# Patient Record
Sex: MALE | Race: BLACK OR AFRICAN AMERICAN | Employment: OTHER | URBAN - METROPOLITAN AREA
[De-identification: names, ages, dates, MRNs, and addresses within clinical notes are randomized per-mention and may not be internally consistent; named-entity substitution may affect disease eponyms.]

---

## 2021-10-01 ENCOUNTER — APPOINTMENT (OUTPATIENT)
Dept: GENERAL RADIOLOGY | Age: 61
End: 2021-10-01
Attending: EMERGENCY MEDICINE
Payer: MEDICARE

## 2021-10-01 ENCOUNTER — HOSPITAL ENCOUNTER (EMERGENCY)
Age: 61
Discharge: HOME | End: 2021-10-01
Attending: EMERGENCY MEDICINE
Payer: MEDICARE

## 2021-10-01 VITALS
OXYGEN SATURATION: 100 % | SYSTOLIC BLOOD PRESSURE: 149 MMHG | HEART RATE: 68 BPM | RESPIRATION RATE: 19 BRPM | DIASTOLIC BLOOD PRESSURE: 85 MMHG

## 2021-10-01 DIAGNOSIS — R07.89 ATYPICAL CHEST PAIN: Primary | ICD-10-CM

## 2021-10-01 LAB
ALBUMIN SERPL-MCNC: 4.3 G/DL (ref 3.4–5)
ALBUMIN/GLOB SERPL: 1.3 {RATIO} (ref 0.8–1.7)
ALP SERPL-CCNC: 90 U/L (ref 45–117)
ALT SERPL-CCNC: 30 U/L (ref 16–61)
ANION GAP SERPL CALC-SCNC: 6 MMOL/L (ref 3–18)
AST SERPL-CCNC: 16 U/L (ref 10–38)
BASOPHILS # BLD: 0.1 K/UL (ref 0–0.1)
BASOPHILS NFR BLD: 1 % (ref 0–2)
BILIRUB SERPL-MCNC: 0.5 MG/DL (ref 0.2–1)
BNP SERPL-MCNC: 59 PG/ML (ref 0–900)
BUN SERPL-MCNC: 16 MG/DL (ref 7–18)
BUN/CREAT SERPL: 14 (ref 12–20)
CALCIUM SERPL-MCNC: 9.7 MG/DL (ref 8.5–10.1)
CHLORIDE SERPL-SCNC: 108 MMOL/L (ref 100–111)
CK MB CFR SERPL CALC: NORMAL % (ref 0–4)
CK MB CFR SERPL CALC: NORMAL % (ref 0–4)
CK MB SERPL-MCNC: <1 NG/ML (ref 5–25)
CK MB SERPL-MCNC: <1 NG/ML (ref 5–25)
CK SERPL-CCNC: 235 U/L (ref 39–308)
CK SERPL-CCNC: 254 U/L (ref 39–308)
CO2 SERPL-SCNC: 28 MMOL/L (ref 21–32)
CREAT SERPL-MCNC: 1.12 MG/DL (ref 0.6–1.3)
D DIMER PPP FEU-MCNC: 0.31 UG/ML(FEU)
DIFFERENTIAL METHOD BLD: ABNORMAL
EOSINOPHIL # BLD: 0.1 K/UL (ref 0–0.4)
EOSINOPHIL NFR BLD: 1 % (ref 0–5)
ERYTHROCYTE [DISTWIDTH] IN BLOOD BY AUTOMATED COUNT: 12.1 % (ref 11.6–14.5)
GLOBULIN SER CALC-MCNC: 3.3 G/DL (ref 2–4)
GLUCOSE SERPL-MCNC: 195 MG/DL (ref 74–99)
HCT VFR BLD AUTO: 44.5 % (ref 36–48)
HGB BLD-MCNC: 14.7 G/DL (ref 13–16)
LYMPHOCYTES # BLD: 1.2 K/UL (ref 0.9–3.6)
LYMPHOCYTES NFR BLD: 32 % (ref 21–52)
MAGNESIUM SERPL-MCNC: 2.1 MG/DL (ref 1.6–2.6)
MCH RBC QN AUTO: 27 PG (ref 24–34)
MCHC RBC AUTO-ENTMCNC: 33 G/DL (ref 31–37)
MCV RBC AUTO: 81.7 FL (ref 78–100)
MONOCYTES # BLD: 0.4 K/UL (ref 0.05–1.2)
MONOCYTES NFR BLD: 9 % (ref 3–10)
NEUTS SEG # BLD: 2.2 K/UL (ref 1.8–8)
NEUTS SEG NFR BLD: 56 % (ref 40–73)
PLATELET # BLD AUTO: 215 K/UL (ref 135–420)
PMV BLD AUTO: 10.4 FL (ref 9.2–11.8)
POTASSIUM SERPL-SCNC: 3.8 MMOL/L (ref 3.5–5.5)
PROT SERPL-MCNC: 7.6 G/DL (ref 6.4–8.2)
RBC # BLD AUTO: 5.45 M/UL (ref 4.35–5.65)
SODIUM SERPL-SCNC: 142 MMOL/L (ref 136–145)
TROPONIN I SERPL-MCNC: <0.02 NG/ML (ref 0–0.04)
TROPONIN I SERPL-MCNC: <0.02 NG/ML (ref 0–0.04)
WBC # BLD AUTO: 3.9 K/UL (ref 4.6–13.2)

## 2021-10-01 PROCEDURE — 80053 COMPREHEN METABOLIC PANEL: CPT

## 2021-10-01 PROCEDURE — 71045 X-RAY EXAM CHEST 1 VIEW: CPT

## 2021-10-01 PROCEDURE — 82553 CREATINE MB FRACTION: CPT

## 2021-10-01 PROCEDURE — 93005 ELECTROCARDIOGRAM TRACING: CPT

## 2021-10-01 PROCEDURE — 85025 COMPLETE CBC W/AUTO DIFF WBC: CPT

## 2021-10-01 PROCEDURE — 83880 ASSAY OF NATRIURETIC PEPTIDE: CPT

## 2021-10-01 PROCEDURE — 85379 FIBRIN DEGRADATION QUANT: CPT

## 2021-10-01 PROCEDURE — 99284 EMERGENCY DEPT VISIT MOD MDM: CPT

## 2021-10-01 PROCEDURE — 74011250637 HC RX REV CODE- 250/637: Performed by: EMERGENCY MEDICINE

## 2021-10-01 PROCEDURE — 83735 ASSAY OF MAGNESIUM: CPT

## 2021-10-01 RX ORDER — CLOPIDOGREL BISULFATE 75 MG/1
75 TABLET ORAL DAILY
Status: DISCONTINUED | OUTPATIENT
Start: 2021-10-02 | End: 2021-10-01 | Stop reason: HOSPADM

## 2021-10-01 RX ORDER — CLOPIDOGREL BISULFATE 75 MG/1
300 TABLET ORAL
Status: COMPLETED | OUTPATIENT
Start: 2021-10-01 | End: 2021-10-01

## 2021-10-01 RX ADMIN — CLOPIDOGREL BISULFATE 300 MG: 75 TABLET ORAL at 13:25

## 2021-10-01 NOTE — ED PROVIDER NOTES
EMERGENCY DEPARTMENT HISTORY AND PHYSICAL EXAM    Date: 10/1/2021  Patient Name: Stacie Alfaro. History of Presenting Illness     Chief Complaint   Patient presents with    Chest Pain         History Provided By: Patient      Additional History (Context): Stacie Chaudhari is a 59 y.o. male with diabetes, obesity, myocardial infarction and asthma who presents with disclosure of less than 5 minutes of chest pain last night. Patient was in preop for his right rotator cuff repair and disclose that he had chest pain last night. He said it was a 10 out of 10 retrosternal chest pain last night while at rest at home. He immediately because of the pain's intensity took a nitroglycerin tablet and his pain subsided within 4 minutes after that. Patient said he had a STEMI in June 2021 he has been off of his Plavix for the rotator cuff surgery. Last night was the first time he had had chest pain since having his STEMI in 2021 June. He has not seen cardiology as an outpatient since his STEMI in 2021. Review of cardiology notes from [facility] cardiology specialist shows a history of anterior STEMI/CAD status post PCI to mid LAD lesion and mechanical thrombectomy of distal LAD lesion on aspirin and Plavix. History of ischemic cardiomyopathy with an LVEF of 50% improved to 60% of any recent echo. Stable and euvolemic well compensated on exam.  He is post STEMI on Siomara 15, 2021. Plavix can be held for 5 days prior to surgery and restart it as soon as safe to do so postoperative bleed. PCP: Bimal Morrow NP    Current Facility-Administered Medications   Medication Dose Route Frequency Provider Last Rate Last Admin    [START ON 10/2/2021] clopidogreL (PLAVIX) tablet 75 mg  75 mg Oral DAILY Ami Parry MD         Current Outpatient Medications   Medication Sig Dispense Refill    triamcinolone (KENALOG) 0.1 % lotion Apply  to affected area three (3) times daily.  use thin layer      albuterol (PROVENTIL HFA, VENTOLIN HFA) 90 mcg/actuation inhaler Take  by inhalation.  fluticasone-salmeterol (ADVAIR DISKUS) 250-50 mcg/dose diskus inhaler Take 1 Puff by inhalation every twelve (12) hours.  HYDROcodone-acetaminophen (NORCO) 5-325 mg per tablet Take  by mouth.  ranitidine (ZANTAC) 150 mg tablet Take 150 mg by mouth two (2) times a day.  hydrocortisone (ANUSOL-HC) 25 mg suppository Insert 25 mg into rectum two (2) times a day.  tobramycin (TOBREX) 0.3 % ophthalmic ointment Administer  to both eyes three (3) times daily.  gabapentin (NEURONTIN) 300 mg capsule Take 300 mg by mouth three (3) times daily.  omega-3 fatty acids-vitamin e (FISH OIL) 1,000 mg cap Take 1 Cap by mouth.  hydrOXYzine (ATARAX) 50 mg tablet Take 50 mg by mouth three (3) times daily as needed for Itching.  cyclobenzaprine (FLEXERIL) 10 mg tablet Take  by mouth three (3) times daily as needed for Muscle Spasm(s).  colchicine (COLCRYS) 0.6 mg tablet Take 0.6 mg by mouth daily.  amLODIPine (NORVASC) 10 mg tablet Take  by mouth daily.  lisinopril (PRINIVIL, ZESTRIL) 20 mg tablet Take  by mouth daily.  garlic 4,363 mg cap Take  by mouth.  mupirocin (BACTROBAN) 2 % ointment Apply  to affected area daily.  doxycycline (ADOXA) 100 mg tablet Take 1 Tab by mouth two (2) times a day.  20 Tab 0       Past History     Past Medical History:  Past Medical History:   Diagnosis Date    Arthritic-like pain     Asthma     Chest pain     Deaf     Diverticulosis     DJD (degenerative joint disease)     DM (diabetes mellitus) ([facility] [address]     Dyspnea     Epididymal cyst     GERD (gastroesophageal reflux disease)     Groin pain     HTN (hypertension)     Hyperglycemia     Neck pain     Testicular pain        Past Surgical History:  Past Surgical History:   Procedure Laterality Date    HX SMALL BOWEL RESECTION      HX TONSIL AND ADENOIDECTOMY      HX UROLOGICAL      spermatocele excision       Family History:  No family history on file. Social History:  Social History     Tobacco Use    Smoking status: Never Smoker    Smokeless tobacco: Never Used   Substance Use Topics    Alcohol use: No    Drug use: No       Allergies:  No Known Allergies      Review of Systems   Review of Systems   Constitutional: Negative for fever. Respiratory: Negative for shortness of breath. Cardiovascular: Positive for chest pain. Gastrointestinal: Negative for abdominal pain. All other systems reviewed and are negative.     All Other Systems Negative  Physical Exam     Vitals:    10/01/21 0933 10/01/21 0950   BP:  (!) 149/85   Pulse: 79 68   Resp: 16 19   SpO2: 100% 100%     Physical Exam     Constitutional: Well appearing, no acute distress  Head: Normocephalic, Atraumatic  Eyes:  EOMI  Neck: Supple  Cardiovascular: Regular rate and rhythm, no murmurs, rubs, or gallops  Chest: Normal work of breathing and chest excursion bilaterally  Lungs: Clear to ausculation bilaterally  Abdomen: Soft, non tender, non distended  Back: No evidence of trauma or deformity  Extremities: No evidence of trauma or deformity, no LE edema  Skin: Warm and dry, normal cap refill  Neuro: Alert and appropriate  Psychiatric: Normal mood and affect        Diagnostic Study Results     Labs -     Recent Results (from the past 12 hour(s))   EKG, 12 LEAD, INITIAL    Collection Time: 10/01/21  9:27 AM   Result Value Ref Range    Ventricular Rate 77 BPM    Atrial Rate 77 BPM    P-R Interval 188 ms    QRS Duration 84 ms    Q-T Interval 354 ms    QTC Calculation (Bezet) 400 ms    Calculated P Axis 62 degrees    Calculated R Axis -22 degrees    Calculated T Axis 68 degrees    Diagnosis       Normal sinus rhythm  Minimal voltage criteria for LVH, may be normal variant  Inferior infarct , age undetermined  Anterior infarct , age undetermined  Abnormal ECG  No previous ECGs available     CBC WITH AUTOMATED DIFF    Collection Time: 10/01/21  9:55 AM   Result Value Ref Range    WBC 3.9 (L) 4.6 - 13.2 K/uL    RBC 5.45 4.35 - 5.65 M/uL    HGB 14.7 13.0 - 16.0 g/dL    HCT 44.5 36.0 - 48.0 %    MCV 81.7 78.0 - 100.0 FL    MCH 27.0 24.0 - 34.0 PG    MCHC 33.0 31.0 - 37.0 g/dL    RDW 12.1 11.6 - 14.5 %    PLATELET 666 063 - 510 K/uL    MPV 10.4 9.2 - 11.8 FL    NEUTROPHILS 56 40 - 73 %    LYMPHOCYTES 32 21 - 52 %    MONOCYTES 9 3 - 10 %    EOSINOPHILS 1 0 - 5 %    BASOPHILS 1 0 - 2 %    ABS. NEUTROPHILS 2.2 1.8 - 8.0 K/UL    ABS. LYMPHOCYTES 1.2 0.9 - 3.6 K/UL    ABS. MONOCYTES 0.4 0.05 - 1.2 K/UL    ABS. EOSINOPHILS 0.1 0.0 - 0.4 K/UL    ABS. BASOPHILS 0.1 0.0 - 0.1 K/UL    DF AUTOMATED     METABOLIC PANEL, COMPREHENSIVE    Collection Time: 10/01/21  9:55 AM   Result Value Ref Range    Sodium 142 136 - 145 mmol/L    Potassium 3.8 3.5 - 5.5 mmol/L    Chloride 108 100 - 111 mmol/L    CO2 28 21 - 32 mmol/L    Anion gap 6 3.0 - 18 mmol/L    Glucose 195 (H) 74 - 99 mg/dL    BUN 16 7.0 - 18 MG/DL    Creatinine 1.12 0.6 - 1.3 MG/DL    BUN/Creatinine ratio 14 12 - 20      GFR est AA >60 >60 ml/min/1.73m2    GFR est non-AA >60 >60 ml/min/1.73m2    Calcium 9.7 8.5 - 10.1 MG/DL    Bilirubin, total 0.5 0.2 - 1.0 MG/DL    ALT (SGPT) 30 16 - 61 U/L    AST (SGOT) 16 10 - 38 U/L    Alk.  phosphatase 90 45 - 117 U/L    Protein, total 7.6 6.4 - 8.2 g/dL    Albumin 4.3 3.4 - 5.0 g/dL    Globulin 3.3 2.0 - 4.0 g/dL    A-G Ratio 1.3 0.8 - 1.7     CARDIAC PANEL,(CK, CKMB & TROPONIN)    Collection Time: 10/01/21  9:55 AM   Result Value Ref Range    CK - MB <1.0 <3.6 ng/ml    CK-MB Index  0.0 - 4.0 %     CALCULATION NOT PERFORMED WHEN RESULT IS BELOW LINEAR LIMIT     39 - 308 U/L    Troponin-I, QT <0.02 0.0 - 0.045 NG/ML   D DIMER    Collection Time: 10/01/21  9:55 AM   Result Value Ref Range    D DIMER 0.31 <0.46 ug/ml(FEU)   NT-PRO BNP    Collection Time: 10/01/21  9:55 AM   Result Value Ref Range    NT pro-BNP 59 0 - 900 PG/ML   MAGNESIUM    Collection Time: 10/01/21  9:55 AM   Result Value Ref Range    Magnesium 2.1 1.6 - 2.6 mg/dL   CARDIAC PANEL,(CK, CKMB & TROPONIN)    Collection Time: 10/01/21 12:00 PM   Result Value Ref Range    CK - MB <1.0 <3.6 ng/ml    CK-MB Index  0.0 - 4.0 %     CALCULATION NOT PERFORMED WHEN RESULT IS BELOW LINEAR LIMIT     39 - 308 U/L    Troponin-I, QT <0.02 0.0 - 0.045 NG/ML     EKG interpretation: (Preliminary)  EKG read by Dr. Dinesh Cuadra at 12:14 PM  Normal sinus rhythm at a rate of 71 bpm, NJ interval 176 ms, QRS duration of 92 ms, unchanged when compared to prior from earlier today    Radiologic Studies -   XR CHEST PORT   Final Result      No acute cardiopulmonary abnormality. CT Results  (Last 48 hours)    None        CXR Results  (Last 48 hours)               10/01/21 1005  XR CHEST PORT Final result    Impression:      No acute cardiopulmonary abnormality. Narrative:  EXAM: XR CHEST PORT       CLINICAL INDICATION/HISTORY: CP   -Additional: None       COMPARISON: None       TECHNIQUE: Portable frontal view of the chest       _______________       FINDINGS:       SUPPORT DEVICES: None. HEART AND MEDIASTINUM: Cardiomediastinal silhouette within normal limits. LUNGS AND PLEURAL SPACES: No dense consolidation, large effusion or   pneumothorax.       _______________                   Medical Decision Making   I am the first provider for this patient. I reviewed the vital signs, available nursing notes, past medical history, past surgical history, family history and social history. Vital Signs-Reviewed the patient's vital signs. Pulse Oximetry Analysis - 100% on room air, not hypoxic    Cardiac Monitor:  Rate: 68  Rhythm: Normal sinus    EKG: Interpreted by the EP.   Dr. Dinesh Cuadra   Normal sinus rhythm at rate of 77 bpm, NJ interval 108 ms, QRS duration of 84 ms    Records Reviewed: Nursing Notes, Old Medical Records and Previous electrocardiograms    Procedures:  Procedures    Provider Notes (Medical Decision Making):   Patient presenting for an episode of chest pain that occurred last night. Patient asymptomatic today but was in preop when he reported the episode of chest pain and was directed to the emergency department by the preop team.  Patient hemodynamically stable. Labs benign including negative D-dimer and cardiac enzymes negative x2. Patient has remained asymptomatic throughout his ED stay. Discussed with cardiology who states patient should not have been taken off of his Plavix. Patient given a loading dose of Plavix here and restarted on his Plavix. Plan for discharge with early primary care and cardiology follow-up. Patient understands and agrees with this plan. MED RECONCILIATION:  Current Facility-Administered Medications   Medication Dose Route Frequency    [START ON 10/2/2021] clopidogreL (PLAVIX) tablet 75 mg  75 mg Oral DAILY     Current Outpatient Medications   Medication Sig    triamcinolone (KENALOG) 0.1 % lotion Apply  to affected area three (3) times daily. use thin layer    albuterol (PROVENTIL HFA, VENTOLIN HFA) 90 mcg/actuation inhaler Take  by inhalation.  fluticasone-salmeterol (ADVAIR DISKUS) 250-50 mcg/dose diskus inhaler Take 1 Puff by inhalation every twelve (12) hours.  HYDROcodone-acetaminophen (NORCO) 5-325 mg per tablet Take  by mouth.  ranitidine (ZANTAC) 150 mg tablet Take 150 mg by mouth two (2) times a day.  hydrocortisone (ANUSOL-HC) 25 mg suppository Insert 25 mg into rectum two (2) times a day.  tobramycin (TOBREX) 0.3 % ophthalmic ointment Administer  to both eyes three (3) times daily.  gabapentin (NEURONTIN) 300 mg capsule Take 300 mg by mouth three (3) times daily.  omega-3 fatty acids-vitamin e (FISH OIL) 1,000 mg cap Take 1 Cap by mouth.  hydrOXYzine (ATARAX) 50 mg tablet Take 50 mg by mouth three (3) times daily as needed for Itching.     cyclobenzaprine (FLEXERIL) 10 mg tablet Take  by mouth three (3) times daily as needed for Muscle Spasm(s).  colchicine (COLCRYS) 0.6 mg tablet Take 0.6 mg by mouth daily.  amLODIPine (NORVASC) 10 mg tablet Take  by mouth daily.  lisinopril (PRINIVIL, ZESTRIL) 20 mg tablet Take  by mouth daily.  garlic 7,744 mg cap Take  by mouth.  mupirocin (BACTROBAN) 2 % ointment Apply  to affected area daily.  doxycycline (ADOXA) 100 mg tablet Take 1 Tab by mouth two (2) times a day. CONSULT NOTE:   11:56 AM  Dr. Paulette Epps spoke with Dr. Zabrina Marie   Specialty: Cardiology  Discussed pt's hx, disposition, and available diagnostic and imaging results over the telephone. Reviewed care plans. Should not stop plavix. Load with plavix and restart at home. Repeat EKG and cardiac panel, if normal can be discharged with close outpatient cardiology follow up.     12:13 PM  Updated patient on all results and plan. All questions answered. Disposition:  Discharge home    DISCHARGE NOTE:     Pt has been reexamined. Patient has no new complaints, changes, or physical findings. Care plan outlined and precautions discussed. Results of labs and imaging were reviewed with the patient. All medications were reviewed with the patient; will d/c home with reinitiation of Plavix and return precautions. All of pt's questions and concerns were addressed. Patient was instructed and agrees to follow up with primary care and cardiology, as well as to return to the ED upon further deterioration. Patient is ready to go home.     Follow-up Information     Follow up With Specialties Details Why Contact Info    Amy Jennings NP  Schedule an appointment as soon as possible for a visit   [address] [place] [address]      Elvis Howard MD Cardiology, Internal Medicine Schedule an appointment as soon as possible for a visit  Or Your Cardiologist [address] [place] [address] [address] [phone]      [facility] EMERGENCY DEPT Emergency Medicine  If symptoms worsen [address] [place] [id number]  [phone]          Discharge Medication List as of 10/1/2021  1:18 PM          Critical Care Time: None      Diagnosis     Clinical Impression:   1.  Atypical chest pain

## 2021-10-01 NOTE — ED NOTES
Pt in room on full monitor. Pt is deaf interrupter used for triage and PA. Pt resting comfortable at this time.

## 2021-10-01 NOTE — ED NOTES
Pt was in outpatient surgery for R rotator cuff repair, when he told the nurse that last night he had chest pain for about 5 minutes that was relieved with nitro. Pt denies chest pain at this time.   Pt has hx of stemi in the past.

## 2021-10-01 NOTE — ED NOTES
Pt in room new blood drawn, ekg done. Went to give medication plavix, but med machine out of meds. Pharmacy called and stated they will bring some. Pt is aaox4 and still on monitor.

## 2021-10-02 LAB
ATRIAL RATE: 71 BPM
ATRIAL RATE: 77 BPM
CALCULATED P AXIS, ECG09: 57 DEGREES
CALCULATED P AXIS, ECG09: 62 DEGREES
CALCULATED R AXIS, ECG10: -22 DEGREES
CALCULATED R AXIS, ECG10: -26 DEGREES
CALCULATED T AXIS, ECG11: 68 DEGREES
CALCULATED T AXIS, ECG11: 85 DEGREES
DIAGNOSIS, 93000: NORMAL
DIAGNOSIS, 93000: NORMAL
P-R INTERVAL, ECG05: 176 MS
P-R INTERVAL, ECG05: 188 MS
Q-T INTERVAL, ECG07: 354 MS
Q-T INTERVAL, ECG07: 386 MS
QRS DURATION, ECG06: 84 MS
QRS DURATION, ECG06: 92 MS
QTC CALCULATION (BEZET), ECG08: 400 MS
QTC CALCULATION (BEZET), ECG08: 419 MS
VENTRICULAR RATE, ECG03: 71 BPM
VENTRICULAR RATE, ECG03: 77 BPM

## 2023-05-23 RX ORDER — AMLODIPINE BESYLATE 10 MG/1
TABLET ORAL DAILY
COMMUNITY

## 2023-05-23 RX ORDER — LISINOPRIL 20 MG/1
TABLET ORAL DAILY
COMMUNITY

## 2023-05-23 RX ORDER — HYDROCODONE BITARTRATE AND ACETAMINOPHEN 5; 325 MG/1; MG/1
TABLET ORAL
COMMUNITY

## 2023-05-23 RX ORDER — FLUTICASONE PROPIONATE AND SALMETEROL 250; 50 UG/1; UG/1
1 POWDER RESPIRATORY (INHALATION) EVERY 12 HOURS
COMMUNITY

## 2023-05-23 RX ORDER — GABAPENTIN 300 MG/1
300 CAPSULE ORAL 3 TIMES DAILY
COMMUNITY

## 2023-05-23 RX ORDER — DOXYCYCLINE 100 MG/1
100 TABLET ORAL 2 TIMES DAILY
COMMUNITY
Start: 2014-03-17

## 2023-05-23 RX ORDER — RANITIDINE 150 MG/1
150 TABLET ORAL 2 TIMES DAILY
COMMUNITY

## 2023-05-23 RX ORDER — TRIAMCINOLONE ACETONIDE 1 MG/ML
LOTION TOPICAL 3 TIMES DAILY
COMMUNITY

## 2023-05-23 RX ORDER — CYCLOBENZAPRINE HCL 10 MG
TABLET ORAL 3 TIMES DAILY PRN
COMMUNITY

## 2023-05-23 RX ORDER — HYDROXYZINE 50 MG/1
50 TABLET, FILM COATED ORAL 3 TIMES DAILY PRN
COMMUNITY

## 2023-05-23 RX ORDER — HYDROCORTISONE ACETATE 25 MG/1
25 SUPPOSITORY RECTAL 2 TIMES DAILY
COMMUNITY

## 2023-05-23 RX ORDER — ALBUTEROL SULFATE 90 UG/1
AEROSOL, METERED RESPIRATORY (INHALATION)
COMMUNITY

## 2023-05-23 RX ORDER — COLCHICINE 0.6 MG/1
0.6 TABLET ORAL DAILY
COMMUNITY